# Patient Record
Sex: MALE | Race: OTHER
[De-identification: names, ages, dates, MRNs, and addresses within clinical notes are randomized per-mention and may not be internally consistent; named-entity substitution may affect disease eponyms.]

---

## 2021-02-16 NOTE — NUR
Pt states having right sided neck swelling. Saw tele doc x 1 day ago, was 
started on abx. Pt continues ot have swelling and a stated fever of 103 today. 
Pt has been taking abx, motrin, and tylenol. Pt with patent airway, no 
difficulty swallowing or breathing.

## 2021-02-16 NOTE — NUR
DC'd with written and verbal instructions. Pt states understanding. Medications 
and dx gone over with patient. Pt ambulatory out of ED without difficulty.

## 2021-04-06 ENCOUNTER — HOSPITAL ENCOUNTER (EMERGENCY)
Dept: HOSPITAL 8 - ED | Age: 36
LOS: 1 days | Discharge: HOME | End: 2021-04-07
Payer: COMMERCIAL

## 2021-04-06 VITALS — HEIGHT: 65 IN | BODY MASS INDEX: 24.61 KG/M2 | WEIGHT: 147.71 LBS

## 2021-04-06 VITALS — DIASTOLIC BLOOD PRESSURE: 75 MMHG | SYSTOLIC BLOOD PRESSURE: 116 MMHG

## 2021-04-06 DIAGNOSIS — A87.9: Primary | ICD-10-CM

## 2021-04-06 DIAGNOSIS — R51.9: ICD-10-CM

## 2021-04-06 LAB
ALBUMIN SERPL-MCNC: 3.9 G/DL (ref 3.4–5)
ALP SERPL-CCNC: 160 U/L (ref 45–117)
ALT SERPL-CCNC: 310 U/L (ref 12–78)
ANION GAP SERPL CALC-SCNC: 5 MMOL/L (ref 5–15)
BASOPHILS # BLD AUTO: 0 X10^3/UL (ref 0–0.1)
BASOPHILS NFR BLD AUTO: 0 % (ref 0–1)
BILIRUB SERPL-MCNC: 0.9 MG/DL (ref 0.2–1)
CALCIUM SERPL-MCNC: 9 MG/DL (ref 8.5–10.1)
CHLORIDE SERPL-SCNC: 103 MMOL/L (ref 98–107)
CREAT SERPL-MCNC: 1.12 MG/DL (ref 0.7–1.3)
CRP SERPL-MCNC: 0.43 MG/DL (ref 0.02–0.49)
EOSINOPHIL # BLD AUTO: 0 X10^3/UL (ref 0–0.4)
EOSINOPHIL NFR BLD AUTO: 0 % (ref 1–7)
ERYTHROCYTE [DISTWIDTH] IN BLOOD BY AUTOMATED COUNT: 13.6 % (ref 9.4–14.8)
ERYTHROCYTE [SEDIMENTATION RATE] IN BLOOD BY WESTERGREN METHOD: 31 MM/HR (ref 0–10)
GLUCOSE CSF-MCNC: 53 MG/DL (ref 40–80)
HCT (SEDRATE): 45.3 % (ref 39.2–51.8)
LYMPHOCYTES # BLD AUTO: 0.6 X10^3/UL (ref 1–3.4)
LYMPHOCYTES NFR BLD AUTO: 10 % (ref 22–44)
MCH RBC QN AUTO: 27.2 PG (ref 27.5–34.5)
MCHC RBC AUTO-ENTMCNC: 33.3 G/DL (ref 33.2–36.2)
MD: NO
MONOCYTES # BLD AUTO: 0.1 X10^3/UL (ref 0.2–0.8)
MONOCYTES NFR BLD AUTO: 2 % (ref 2–9)
NEUTROPHILS # BLD AUTO: 5.7 X10^3/UL (ref 1.8–6.8)
NEUTROPHILS NFR BLD AUTO: 88 % (ref 42–75)
PLATELET # BLD AUTO: 215 X10^3/UL (ref 130–400)
PMV BLD AUTO: 8.2 FL (ref 7.4–10.4)
PROT SERPL-MCNC: 8.6 G/DL (ref 6.4–8.2)
RBC # BLD AUTO: 5.56 X10^6/UL (ref 4.38–5.82)
TOTAL PROTEIN,CSF: 61 MG/DL (ref 15–45)

## 2021-04-06 PROCEDURE — 36415 COLL VENOUS BLD VENIPUNCTURE: CPT

## 2021-04-06 PROCEDURE — 85025 COMPLETE CBC W/AUTO DIFF WBC: CPT

## 2021-04-06 PROCEDURE — 85651 RBC SED RATE NONAUTOMATED: CPT

## 2021-04-06 PROCEDURE — 84157 ASSAY OF PROTEIN OTHER: CPT

## 2021-04-06 PROCEDURE — 87205 SMEAR GRAM STAIN: CPT

## 2021-04-06 PROCEDURE — 80053 COMPREHEN METABOLIC PANEL: CPT

## 2021-04-06 PROCEDURE — 70450 CT HEAD/BRAIN W/O DYE: CPT

## 2021-04-06 PROCEDURE — 87070 CULTURE OTHR SPECIMN AEROBIC: CPT

## 2021-04-06 PROCEDURE — 96361 HYDRATE IV INFUSION ADD-ON: CPT

## 2021-04-06 PROCEDURE — 86140 C-REACTIVE PROTEIN: CPT

## 2021-04-06 PROCEDURE — 96374 THER/PROPH/DIAG INJ IV PUSH: CPT

## 2021-04-06 PROCEDURE — 96375 TX/PRO/DX INJ NEW DRUG ADDON: CPT

## 2021-04-06 PROCEDURE — 89051 BODY FLUID CELL COUNT: CPT

## 2021-04-06 PROCEDURE — 82945 GLUCOSE OTHER FLUID: CPT

## 2021-04-06 PROCEDURE — 87252 VIRUS INOCULATION TISSUE: CPT

## 2021-04-06 PROCEDURE — 86308 HETEROPHILE ANTIBODY SCREEN: CPT

## 2021-04-06 PROCEDURE — 99285 EMERGENCY DEPT VISIT HI MDM: CPT

## 2021-04-06 PROCEDURE — 62328 DX LMBR SPI PNXR W/FLUOR/CT: CPT

## 2021-04-06 NOTE — NUR
PT AT BEDSIDE LYING FLAT AFTER LP STATES IS IN NO PAIN AT THIS TIME. VSS. CALL 
REMOTE WITH IN REACH.

## 2021-04-06 NOTE — NUR
PT STATES STARTED BACK FEB 16-18TH HAD SOME NECK PAIN AND WAS TREATED WITH AN 
ANTIBIOTIC CEFLAXIN THAT DIDN'T WORK. WAS THEN TREATED WITH AUMENTIN FOR 7 DAYS 
WHICH ENDED WITH A SLIGHT RASH. 



ABOUT 2 WEEKS AGO HAD LEFT SCROTUM PAIN THAT MOVED TO THE RIGHT THIS LASTED 
ABOUT A WEEK THEN 3 DAYS AGO HAD A RANKIN THAT PROGRESSIVELY GOT WORSE WAS 
PRESCRIBED PREDINISONE THAT HAS HELPED.

## 2021-04-10 ENCOUNTER — HOSPITAL ENCOUNTER (EMERGENCY)
Dept: HOSPITAL 8 - ED | Age: 36
Discharge: HOME | End: 2021-04-10
Payer: COMMERCIAL

## 2021-04-10 VITALS — BODY MASS INDEX: 24.79 KG/M2 | WEIGHT: 148.81 LBS | HEIGHT: 65 IN

## 2021-04-10 VITALS — DIASTOLIC BLOOD PRESSURE: 80 MMHG | SYSTOLIC BLOOD PRESSURE: 120 MMHG

## 2021-04-10 DIAGNOSIS — G97.1: Primary | ICD-10-CM

## 2021-04-10 DIAGNOSIS — J45.909: ICD-10-CM

## 2021-04-10 PROCEDURE — 99285 EMERGENCY DEPT VISIT HI MDM: CPT

## 2021-04-10 NOTE — NUR
-------------------------------------------------------------------------------

          *** Note undone in [facility] - 04/10/21 at 1343 by MIS ***           

-------------------------------------------------------------------------------

lumbar puncture done

## 2021-04-10 NOTE — NUR
Pt placed flat on back with pillows under both knees, will remain in this 
position for 1 hr per Dr Burgos

## 2021-04-10 NOTE — NUR
JONH RN: PT DISCHARGED FOR PRIMARY RN. NO ACUTE DISTRESS NOTED. VS STABLE. PT 
VERBALIZES DISCHARGE INSTRUCTIONS. PT DISCHARGED PER DR SCHMIDT.

## 2021-04-10 NOTE — NUR
Pt had lumbar puncture on 4/6 and has a HA that is a 1 when lying down and 10 
when sitting up. Pain from HA occured friday. VSS, NADN, pt lying flat. Pt said 
pain is unrelieved by Advil. A&O x4, speaking in full sentences, able to move 
all extremities. Connected to BP and O2 monitors.

## 2022-11-28 ENCOUNTER — OFFICE VISIT (OUTPATIENT)
Dept: MEDICAL GROUP | Facility: MEDICAL CENTER | Age: 37
End: 2022-11-28
Payer: COMMERCIAL

## 2022-11-28 VITALS
WEIGHT: 159.2 LBS | HEIGHT: 65 IN | SYSTOLIC BLOOD PRESSURE: 104 MMHG | TEMPERATURE: 98.9 F | DIASTOLIC BLOOD PRESSURE: 78 MMHG | BODY MASS INDEX: 26.52 KG/M2 | HEART RATE: 71 BPM | RESPIRATION RATE: 16 BRPM | OXYGEN SATURATION: 100 %

## 2022-11-28 DIAGNOSIS — Z13.21 ENCOUNTER FOR VITAMIN DEFICIENCY SCREENING: ICD-10-CM

## 2022-11-28 DIAGNOSIS — L20.84 INTRINSIC ECZEMA: ICD-10-CM

## 2022-11-28 DIAGNOSIS — Z87.09 HISTORY OF ASTHMA: ICD-10-CM

## 2022-11-28 DIAGNOSIS — Z13.29 SCREENING FOR THYROID DISORDER: ICD-10-CM

## 2022-11-28 DIAGNOSIS — R79.89 ELEVATED LFTS: ICD-10-CM

## 2022-11-28 DIAGNOSIS — Z13.0 SCREENING, ANEMIA, DEFICIENCY, IRON: ICD-10-CM

## 2022-11-28 DIAGNOSIS — Z13.89 SCREENING FOR MULTIPLE CONDITIONS: ICD-10-CM

## 2022-11-28 DIAGNOSIS — Z13.220 SCREENING FOR HYPERLIPIDEMIA: ICD-10-CM

## 2022-11-28 DIAGNOSIS — K21.00 GASTROESOPHAGEAL REFLUX DISEASE WITH ESOPHAGITIS WITHOUT HEMORRHAGE: ICD-10-CM

## 2022-11-28 PROCEDURE — 99204 OFFICE O/P NEW MOD 45 MIN: CPT | Performed by: NURSE PRACTITIONER

## 2022-11-28 RX ORDER — CLOBETASOL PROPIONATE 0.5 MG/G
1 CREAM TOPICAL 2 TIMES DAILY
Qty: 30 G | Refills: 0 | Status: SHIPPED | OUTPATIENT
Start: 2022-11-28

## 2022-11-28 NOTE — PROGRESS NOTES
Subjective     Susan Banegas is a 37 y.o. male who presents with establish care          HPI  Seen to establish care.    He is feeling well.    He was seen at [facility] last year for ? Type if URI.  During that time his LFT's were elevated.  They told him poss r/t hepatitis.  He has not redid lab.  All ex of infection are resolved.    He is running for exercise and working out at gym regularly.  He tries to follow healthy diet with lots of veggines and protein.     Intrinsic eczema  He has had rash w/scaling in hands long term.  He never tried steroids.  Not using cream otc now.  Doesn't know name.  Rash on both hands.  occas itching.      Gastroesophageal reflux disease with esophagitis without hemorrhage  He has a habit of overeating at nite with dinner.  To help that he will skip bkfst and lunch.  He will then wake up with stomach upset.  He is now working to improve his diet.  He will wake up with stomach upset and heartburn.  He will burp and feel that his stomach is not emptying.  No black tarry stools.  No blood in stools.     History of asthma  He has had hx of asthma since childhood.  He is not having any sx currently.  He was previously on tx when living in humid area with pollution in [place].  Not currently on meds for tx.  No sx of astham.      Patient Active Problem List    Diagnosis Date Noted    Intrinsic eczema 11/28/2022    Gastroesophageal reflux disease with esophagitis without hemorrhage 11/28/2022    History of asthma 11/28/2022     Current Outpatient Medications   Medication Sig Dispense Refill    clobetasol (TEMOVATE) 0.05 % Cream Apply 1 Application topically 2 times a day. 30 g 0     No current facility-administered medications for this visit.     Patient has no known allergies.  Past Medical History:   Diagnosis Date    Gastroesophageal reflux disease with esophagitis without hemorrhage 11/28/2022    History of asthma 11/28/2022    Intrinsic eczema 11/28/2022     Social History      Socioeconomic History    Marital status:      Spouse name: Not on file    Number of children: Not on file    Years of education: Not on file    Highest education level: Not on file   Occupational History    Not on file   Tobacco Use    Smoking status: Never     Passive exposure: Never    Smokeless tobacco: Never   Vaping Use    Vaping Use: Never used   Substance and Sexual Activity    Alcohol use: Yes     Alcohol/week: 1.8 oz     Types: 3 Standard drinks or equivalent per week    Drug use: Never    Sexual activity: Yes     Partners: Female   Other Topics Concern    Not on file   Social History Narrative    Not on file     Social Determinants of Health     Financial Resource Strain: Not on file   Food Insecurity: Not on file   Transportation Needs: Not on file   Physical Activity: Not on file   Stress: Not on file   Social Connections: Not on file   Intimate Partner Violence: Not on file   Housing Stability: Not on file     Family History   Problem Relation Age of Onset    Diabetes Mother         IFG    Arthritis Mother     Asthma Father     Hyperlipidemia Maternal Grandmother     Hypertension Maternal Grandmother     Diabetes Maternal Grandmother     Cancer Maternal Grandfather         stomach    Asthma Paternal Grandfather        ROS  Review of Systems   Constitutional: Negative.  Negative for fever, chills, weight loss, malaise/fatigue and diaphoresis.   HENT: Negative.  Negative for hearing loss, ear pain, nosebleeds, congestion, sore throat, neck pain, tinnitus and ear discharge.    Eyes: Negative.  Negative for blurred vision, double vision, photophobia, pain, discharge and redness.   Respiratory: Negative.  Negative for cough, hemoptysis, sputum production, shortness of breath, wheezing and stridor.    Cardiovascular: Negative.  Negative for chest pain, palpitations, orthopnea, claudication, leg swelling and PND.   Gastrointestinal: Negative.  Negative for nausea, vomiting, abdominal pain, diarrhea,  "constipation, blood in stool and melena.   Genitourinary: Negative.  Negative for dysuria, urgency, frequency, incontinence, hematuria and flank pain.   Musculoskeletal: Negative.  Negative for myalgias, back pain, joint pain and falls.   Skin: Negative.  Negative for itching and rash.   Neurological: Negative.  Negative for dizziness, tingling, tremors, sensory change, speech change, focal weakness, seizures, loss of consciousness, weakness and headaches.   Endo/Heme/Allergies: Negative.  Negative for environmental allergies and polydipsia. Does not bruise/bleed easily.   Psychiatric/Behavioral: Negative.  Negative for depression, suicidal ideas, hallucinations, memory loss and substance abuse. The patient is not nervous/anxious and does not have insomnia.    All other systems reviewed and are negative.    Objective     /78   Pulse 71   Temp 37.2 °C (98.9 °F) (Temporal)   Resp 16   Ht 1.651 m (5' 5\")   Wt 72.2 kg (159 lb 3.2 oz)   SpO2 100%   BMI 26.49 kg/m²      Physical Exam  Physical Exam   Vitals reviewed.  Constitutional: oriented to person, place, and time. appears well-developed and well-nourished. No distress.   HENT: Head: Normocephalic and atraumatic. Bilateral tympanic membranes wnl w/o bulging.  Right Ear: External ear normal. Left Ear: External ear normal. Nose: Nose normal.  Mouth/Throat: Oropharynx is clear and moist. No oropharyngeal exudate. bart tm wnl. Eyes: Conjunctivae and EOM are normal. Pupils are equal, round, and reactive to light. Right eye exhibits no discharge. Left eye exhibits no discharge. No scleral icterus.    Neck: Normal range of motion. Neck supple. No JVD present.   Cardiovascular: Normal rate, regular rhythm, normal heart sounds and intact distal pulses.  Exam reveals no gallop and no friction rub.  No murmur heard.  No carotid bruits   Pulmonary/Chest: Effort normal and breath sounds normal. No stridor. No respiratory distress. no wheezes or rales. exhibits no " tenderness.   Abdominal: Soft. Bowel sounds are normal. exhibits no distension and no mass. No tenderness. no rebound and no guarding.   Musculoskeletal: Normal range of motion. exhibits no edema or tenderness.  bart pedal pulses 2+.  Lymphadenopathy:  no cervical or supraclavicular adenopathy.   Neurological: alert and oriented to person, place, and time. has normal reflexes. displays normal reflexes. No cranial nerve deficit. exhibits normal muscle tone. Coordination normal.   Skin: Skin is warm and dry. No rash noted. no diaphoresis. No erythema. No pallor.   Psychiatric: normal mood and affect. behavior is normal.     Assessment & Plan        1. Intrinsic eczema  clobetasol (TEMOVATE) 0.05 % Cream    try clobetasol for short term then moisturizing lotion regularly      2. Gastroesophageal reflux disease with esophagitis without hemorrhage      enc pt to eat 3x/day with dec amt eating at dinner.  he is already working on it      3. History of asthma      no current sx or tx needed      4. Elevated LFTs  Comp Metabolic Panel    per pt report had elevated lft's during ER visit last yr for ? type infection.  no sx.  occas etoh only.  missy lab.  f/u for review      5. Screening, anemia, deficiency, iron  CBC WITH DIFFERENTIAL    do preventative lab.  f/u for review      6. Screening for multiple conditions  Comp Metabolic Panel      7. Screening for hyperlipidemia  Lipid Profile      8. Screening for thyroid disorder  TSH WITH REFLEX TO FT4      9. Encounter for vitamin deficiency screening  VITAMIN D,25 HYDROXY (DEFICIENCY)    VITAMIN B12    FOLATE

## 2022-11-28 NOTE — ASSESSMENT & PLAN NOTE
He has had rash w/scaling in hands long term.  He never tried steroids.  Not using cream otc now.  Doesn't know name.  Rash on both hands.  occas itching.

## 2022-11-28 NOTE — ASSESSMENT & PLAN NOTE
He has a habit of overeating at nite with dinner.  To help that he will skip bkfst and lunch.  He will then wake up with stomach upset.  He is now working to improve his diet.  He will wake up with stomach upset and heartburn.  He will burp and feel that his stomach is not emptying.  No black tarry stools.  No blood in stools.

## 2022-11-28 NOTE — ASSESSMENT & PLAN NOTE
He has had hx of asthma since childhood.  He is not having any sx currently.  He was previously on tx when living in humid area with pollution in [place].  Not currently on meds for tx.  No sx of astham.

## 2022-12-01 LAB
25(OH)D3+25(OH)D2 SERPL-MCNC: 12.1 NG/ML (ref 30–100)
ALBUMIN SERPL-MCNC: 4.6 G/DL (ref 4–5)
ALBUMIN/GLOB SERPL: 1.8 {RATIO} (ref 1.2–2.2)
ALP SERPL-CCNC: 54 IU/L (ref 44–121)
ALT SERPL-CCNC: 40 IU/L (ref 0–44)
AST SERPL-CCNC: 25 IU/L (ref 0–40)
BASOPHILS # BLD AUTO: 0 X10E3/UL (ref 0–0.2)
BASOPHILS NFR BLD AUTO: 0 %
BILIRUB SERPL-MCNC: 1.3 MG/DL (ref 0–1.2)
BUN SERPL-MCNC: 15 MG/DL (ref 6–20)
BUN/CREAT SERPL: 13 (ref 9–20)
CALCIUM SERPL-MCNC: 9 MG/DL (ref 8.7–10.2)
CHLORIDE SERPL-SCNC: 102 MMOL/L (ref 96–106)
CHOLEST SERPL-MCNC: 222 MG/DL (ref 100–199)
CO2 SERPL-SCNC: 24 MMOL/L (ref 20–29)
CREAT SERPL-MCNC: 1.16 MG/DL (ref 0.76–1.27)
EGFRCR SERPLBLD CKD-EPI 2021: 83 ML/MIN/1.73
EOSINOPHIL # BLD AUTO: 0.1 X10E3/UL (ref 0–0.4)
EOSINOPHIL NFR BLD AUTO: 2 %
ERYTHROCYTE [DISTWIDTH] IN BLOOD BY AUTOMATED COUNT: 13.1 % (ref 11.6–15.4)
FOLATE SERPL-MCNC: 11.8 NG/ML
GLOBULIN SER CALC-MCNC: 2.6 G/DL (ref 1.5–4.5)
GLUCOSE SERPL-MCNC: 92 MG/DL (ref 70–99)
HCT VFR BLD AUTO: 47.8 % (ref 37.5–51)
HDLC SERPL-MCNC: 44 MG/DL
HGB BLD-MCNC: 15.8 G/DL (ref 13–17.7)
IMM GRANULOCYTES # BLD AUTO: 0 X10E3/UL (ref 0–0.1)
IMM GRANULOCYTES NFR BLD AUTO: 0 %
IMMATURE CELLS  115398: NORMAL
LABORATORY COMMENT REPORT: ABNORMAL
LDLC SERPL CALC-MCNC: 148 MG/DL (ref 0–99)
LYMPHOCYTES # BLD AUTO: 1.8 X10E3/UL (ref 0.7–3.1)
LYMPHOCYTES NFR BLD AUTO: 31 %
MCH RBC QN AUTO: 28.8 PG (ref 26.6–33)
MCHC RBC AUTO-ENTMCNC: 33.1 G/DL (ref 31.5–35.7)
MCV RBC AUTO: 87 FL (ref 79–97)
MONOCYTES # BLD AUTO: 0.4 X10E3/UL (ref 0.1–0.9)
MONOCYTES NFR BLD AUTO: 6 %
MORPHOLOGY BLD-IMP: NORMAL
NEUTROPHILS # BLD AUTO: 3.6 X10E3/UL (ref 1.4–7)
NEUTROPHILS NFR BLD AUTO: 61 %
NRBC BLD AUTO-RTO: NORMAL %
PLATELET # BLD AUTO: 185 X10E3/UL (ref 150–450)
POTASSIUM SERPL-SCNC: 4.5 MMOL/L (ref 3.5–5.2)
PROT SERPL-MCNC: 7.2 G/DL (ref 6–8.5)
RBC # BLD AUTO: 5.48 X10E6/UL (ref 4.14–5.8)
SODIUM SERPL-SCNC: 137 MMOL/L (ref 134–144)
TRIGL SERPL-MCNC: 168 MG/DL (ref 0–149)
TSH SERPL DL<=0.005 MIU/L-ACNC: 2.03 UIU/ML (ref 0.45–4.5)
VIT B12 SERPL-MCNC: 183 PG/ML (ref 232–1245)
VLDLC SERPL CALC-MCNC: 30 MG/DL (ref 5–40)
WBC # BLD AUTO: 5.9 X10E3/UL (ref 3.4–10.8)

## 2022-12-07 ENCOUNTER — TELEPHONE (OUTPATIENT)
Dept: MEDICAL GROUP | Facility: MEDICAL CENTER | Age: 37
End: 2022-12-07
Payer: COMMERCIAL

## 2022-12-07 NOTE — TELEPHONE ENCOUNTER
----- Message from DIANELYS Ward sent at 12/6/2022  1:41 PM PST -----  Please have pt set appointment to review and discuss treatment for labs.

## 2022-12-07 NOTE — LETTER
December 7, 2022        Susan Banegas  [address] [facility], [place] [address]        Yvette Estrada has received your most recent lab work and would like you to make an appointment to be seen either in office or virtually to go over your labs results. Please call [phone] to schedule an appointment or you may schedule via My chart.         Thank you,                                 Marian Dewey, Med Ass't

## 2023-01-23 ENCOUNTER — APPOINTMENT (OUTPATIENT)
Dept: MEDICAL GROUP | Facility: MEDICAL CENTER | Age: 38
End: 2023-01-23
Payer: COMMERCIAL

## 2023-01-24 ENCOUNTER — APPOINTMENT (OUTPATIENT)
Dept: MEDICAL GROUP | Facility: MEDICAL CENTER | Age: 38
End: 2023-01-24
Payer: COMMERCIAL